# Patient Record
Sex: MALE | URBAN - METROPOLITAN AREA
[De-identification: names, ages, dates, MRNs, and addresses within clinical notes are randomized per-mention and may not be internally consistent; named-entity substitution may affect disease eponyms.]

---

## 2023-08-17 ENCOUNTER — OFFICE VISIT (OUTPATIENT)
Dept: URBAN - METROPOLITAN AREA CLINIC 29 | Facility: CLINIC | Age: 23
End: 2023-08-17

## 2023-08-29 ENCOUNTER — OFFICE VISIT (OUTPATIENT)
Dept: URBAN - METROPOLITAN AREA CLINIC 27 | Facility: CLINIC | Age: 23
End: 2023-08-29

## 2023-09-26 ENCOUNTER — WEB ENCOUNTER (OUTPATIENT)
Dept: URBAN - METROPOLITAN AREA CLINIC 27 | Facility: CLINIC | Age: 23
End: 2023-09-26

## 2023-09-26 ENCOUNTER — OFFICE VISIT (OUTPATIENT)
Dept: URBAN - METROPOLITAN AREA CLINIC 27 | Facility: CLINIC | Age: 23
End: 2023-09-26
Payer: COMMERCIAL

## 2023-09-26 ENCOUNTER — LAB OUTSIDE AN ENCOUNTER (OUTPATIENT)
Dept: URBAN - METROPOLITAN AREA CLINIC 27 | Facility: CLINIC | Age: 23
End: 2023-09-26

## 2023-09-26 VITALS
WEIGHT: 174 LBS | SYSTOLIC BLOOD PRESSURE: 131 MMHG | HEIGHT: 71 IN | DIASTOLIC BLOOD PRESSURE: 67 MMHG | HEART RATE: 54 BPM | BODY MASS INDEX: 24.36 KG/M2

## 2023-09-26 DIAGNOSIS — R74.8 ELEVATED LIVER ENZYMES: ICD-10-CM

## 2023-09-26 PROCEDURE — 99204 OFFICE O/P NEW MOD 45 MIN: CPT | Performed by: INTERNAL MEDICINE

## 2023-09-26 NOTE — HPI-TODAY'S VISIT:
23-year-old male here for elevated liver enzymes found on routine blood work with his primary care physician.  Never been told he had elevated liver enzymes before.  No family history of liver disease.  Denies any alcohol use.  Denies IV drug use.  No history of tattoos or blood transfusion.  In reviewing his outside labs, his AST was 332  alkaline phosphatase 145 bilirubin 0.3.  Albumin was 4.7.  The remainder of his labs were normal.  He was vaccinated against hepatitis B.  He had hepatitis serologies including hepatitis a total that was negative, hep B surface antigen negative, hep B core antibody negative, hep B surface antibody negative, hep C antibody negative.

## 2023-10-18 ENCOUNTER — OFFICE VISIT (OUTPATIENT)
Dept: URBAN - METROPOLITAN AREA CLINIC 27 | Facility: CLINIC | Age: 23
End: 2023-10-18
Payer: COMMERCIAL

## 2023-10-18 VITALS
HEIGHT: 71 IN | BODY MASS INDEX: 24.22 KG/M2 | RESPIRATION RATE: 17 BRPM | SYSTOLIC BLOOD PRESSURE: 128 MMHG | WEIGHT: 173 LBS | HEART RATE: 58 BPM | DIASTOLIC BLOOD PRESSURE: 79 MMHG

## 2023-10-18 DIAGNOSIS — R74.8 ELEVATED LIVER ENZYMES: ICD-10-CM

## 2023-10-18 PROCEDURE — 99214 OFFICE O/P EST MOD 30 MIN: CPT | Performed by: INTERNAL MEDICINE

## 2023-10-18 NOTE — HPI-TODAY'S VISIT:
23-year-old male here for elevated liver enzymes.  He was found to have elevated liver enzymes on routine blood work from his primary care physician.  He does have an uncle with liver disease, unknown cause.  He denies any alcohol use.  No IV drug use.  No history of tattoos or blood transfusions.  Outside labs showed   alkaline phosphatase 145 bilirubin 0.3.  He was vaccinated against hepatitis B in the past.  His hepatitis serologies were negative.

## 2023-10-23 LAB
A/G RATIO: 2.1
AFP, SERUM, TUMOR MARKER: 4.6
ALBUMIN: 4.7
ALKALINE PHOSPHATASE: 129
ALPHA-1-ANTITRYPSIN QN: 122
ALT (SGPT): 22
AST (SGOT): 23
BILIRUBIN, TOTAL: 0.6
BUN/CREATININE RATIO: (no result)
BUN: 12
CALCIUM: 9.4
CARBON DIOXIDE, TOTAL: 31
CHLORIDE: 101
CREATININE: 1
EGFR: 109
FERRITIN, SERUM: 74
GLOBULIN, TOTAL: 2.2
GLUCOSE: 95
HEPATITIS C ANTIBODY: (no result)
INR: 1.1
IRON BIND.CAP.(TIBC): 306
IRON SATURATION: 36
IRON: 110
MITOCHONDRIAL (M2) ANTIBODY: <=20
POTASSIUM: 4.4
PROTEIN, TOTAL: 6.9
PT: 11.3
SMOOTH MUSCLE AB SCREEN: POSITIVE
SMOOTH MUSCLE AB TITER: (no result)
SODIUM: 139

## 2023-11-15 ENCOUNTER — TELEPHONE ENCOUNTER (OUTPATIENT)
Dept: URBAN - METROPOLITAN AREA CLINIC 27 | Facility: CLINIC | Age: 23
End: 2023-11-15

## 2023-11-21 ENCOUNTER — OFFICE VISIT (OUTPATIENT)
Dept: URBAN - METROPOLITAN AREA CLINIC 27 | Facility: CLINIC | Age: 23
End: 2023-11-21
Payer: COMMERCIAL

## 2023-11-21 ENCOUNTER — DASHBOARD ENCOUNTERS (OUTPATIENT)
Age: 23
End: 2023-11-21

## 2023-11-21 VITALS
SYSTOLIC BLOOD PRESSURE: 135 MMHG | HEART RATE: 74 BPM | BODY MASS INDEX: 25.34 KG/M2 | WEIGHT: 181 LBS | HEIGHT: 71 IN | DIASTOLIC BLOOD PRESSURE: 79 MMHG

## 2023-11-21 DIAGNOSIS — R74.8 ELEVATED LIVER ENZYMES: ICD-10-CM

## 2023-11-21 PROCEDURE — 99213 OFFICE O/P EST LOW 20 MIN: CPT | Performed by: INTERNAL MEDICINE

## 2023-11-21 NOTE — HPI-TODAY'S VISIT:
23-year-old male here for follow-up of elevated liver enzymes.  He had labs with his primary care physician that showed   alkaline phosphatase 145 bilirubin 0.3.  He denies alcohol use.  No over-the-counter medications or supplements.  No prescription medications.  Serologic workup for liver disease was negative.  Repeat liver enzymes in September were normal.  No family history of liver disease.

## 2023-11-22 LAB
A/G RATIO: 2.1
ALBUMIN: 4.6
ALKALINE PHOSPHATASE: 140
ALT (SGPT): 31
AST (SGOT): 22
BILIRUBIN, TOTAL: 0.4
BUN/CREATININE RATIO: (no result)
BUN: 10
CALCIUM: 10.1
CARBON DIOXIDE, TOTAL: 29
CHLORIDE: 103
CREATININE: 0.86
EGFR: 126
GLOBULIN, TOTAL: 2.2
GLUCOSE: 98
POTASSIUM: 4.5
PROTEIN, TOTAL: 6.8
SODIUM: 140